# Patient Record
Sex: MALE | Race: WHITE | NOT HISPANIC OR LATINO | Employment: FULL TIME | URBAN - METROPOLITAN AREA
[De-identification: names, ages, dates, MRNs, and addresses within clinical notes are randomized per-mention and may not be internally consistent; named-entity substitution may affect disease eponyms.]

---

## 2019-05-10 PROCEDURE — 87491 CHLMYD TRACH DNA AMP PROBE: CPT | Performed by: FAMILY MEDICINE

## 2019-05-10 PROCEDURE — 87591 N.GONORRHOEAE DNA AMP PROB: CPT | Performed by: FAMILY MEDICINE

## 2019-05-10 PROCEDURE — 87661 TRICHOMONAS VAGINALIS AMPLIF: CPT | Performed by: FAMILY MEDICINE

## 2019-05-13 ENCOUNTER — TELEPHONE (OUTPATIENT)
Dept: URGENT CARE | Facility: CLINIC | Age: 34
End: 2019-05-13

## 2019-05-13 NOTE — TELEPHONE ENCOUNTER
----- Message from Bryson Buchanan MD sent at 5/13/2019  7:16 PM EDT -----  Please inform patient of negative lab result

## 2019-05-15 ENCOUNTER — TELEPHONE (OUTPATIENT)
Dept: URGENT CARE | Facility: CLINIC | Age: 34
End: 2019-05-15

## 2022-12-01 ENCOUNTER — APPOINTMENT (OUTPATIENT)
Dept: GENERAL RADIOLOGY | Facility: HOSPITAL | Age: 37
End: 2022-12-01

## 2022-12-01 ENCOUNTER — APPOINTMENT (OUTPATIENT)
Dept: CT IMAGING | Facility: HOSPITAL | Age: 37
End: 2022-12-01

## 2022-12-01 ENCOUNTER — HOSPITAL ENCOUNTER (EMERGENCY)
Facility: HOSPITAL | Age: 37
Discharge: HOME | End: 2022-12-02
Attending: STUDENT IN AN ORGANIZED HEALTH CARE EDUCATION/TRAINING PROGRAM | Admitting: EMERGENCY MEDICINE

## 2022-12-01 DIAGNOSIS — R10.10 ACUTE UPPER ABDOMINAL PAIN: Primary | ICD-10-CM

## 2022-12-01 LAB
ALBUMIN SERPL-MCNC: 4.1 G/DL (ref 3.5–5.2)
ALBUMIN/GLOB SERPL: 1.5 G/DL
ALP SERPL-CCNC: 69 U/L (ref 39–117)
ALT SERPL W P-5'-P-CCNC: 15 U/L (ref 1–41)
ANION GAP SERPL CALCULATED.3IONS-SCNC: 8 MMOL/L (ref 5–15)
AST SERPL-CCNC: 15 U/L (ref 1–40)
BASOPHILS # BLD AUTO: 0.09 10*3/MM3 (ref 0–0.2)
BASOPHILS NFR BLD AUTO: 0.5 % (ref 0–1.5)
BILIRUB SERPL-MCNC: 0.3 MG/DL (ref 0–1.2)
BUN SERPL-MCNC: 16 MG/DL (ref 6–20)
BUN/CREAT SERPL: 25 (ref 7–25)
CALCIUM SPEC-SCNC: 8.9 MG/DL (ref 8.6–10.5)
CHLORIDE SERPL-SCNC: 98 MMOL/L (ref 98–107)
CO2 SERPL-SCNC: 30 MMOL/L (ref 22–29)
CREAT SERPL-MCNC: 0.64 MG/DL (ref 0.76–1.27)
DEPRECATED RDW RBC AUTO: 45.1 FL (ref 37–54)
EGFRCR SERPLBLD CKD-EPI 2021: 125 ML/MIN/1.73
EOSINOPHIL # BLD AUTO: 1.4 10*3/MM3 (ref 0–0.4)
EOSINOPHIL NFR BLD AUTO: 7.4 % (ref 0.3–6.2)
ERYTHROCYTE [DISTWIDTH] IN BLOOD BY AUTOMATED COUNT: 12.6 % (ref 12.3–15.4)
GLOBULIN UR ELPH-MCNC: 2.7 GM/DL
GLUCOSE SERPL-MCNC: 108 MG/DL (ref 65–99)
HCT VFR BLD AUTO: 43.4 % (ref 37.5–51)
HGB BLD-MCNC: 14.5 G/DL (ref 13–17.7)
IMM GRANULOCYTES # BLD AUTO: 0.07 10*3/MM3 (ref 0–0.05)
IMM GRANULOCYTES NFR BLD AUTO: 0.4 % (ref 0–0.5)
LIPASE SERPL-CCNC: 30 U/L (ref 13–60)
LYMPHOCYTES # BLD AUTO: 1.2 10*3/MM3 (ref 0.7–3.1)
LYMPHOCYTES NFR BLD AUTO: 6.4 % (ref 19.6–45.3)
MCH RBC QN AUTO: 32.2 PG (ref 26.6–33)
MCHC RBC AUTO-ENTMCNC: 33.4 G/DL (ref 31.5–35.7)
MCV RBC AUTO: 96.4 FL (ref 79–97)
MONOCYTES # BLD AUTO: 1.13 10*3/MM3 (ref 0.1–0.9)
MONOCYTES NFR BLD AUTO: 6 % (ref 5–12)
NEUTROPHILS NFR BLD AUTO: 14.92 10*3/MM3 (ref 1.7–7)
NEUTROPHILS NFR BLD AUTO: 79.3 % (ref 42.7–76)
NRBC BLD AUTO-RTO: 0 /100 WBC (ref 0–0.2)
NT-PROBNP SERPL-MCNC: 852 PG/ML (ref 0–450)
PLATELET # BLD AUTO: 202 10*3/MM3 (ref 140–450)
PMV BLD AUTO: 9.7 FL (ref 6–12)
POTASSIUM SERPL-SCNC: 4.2 MMOL/L (ref 3.5–5.2)
PROT SERPL-MCNC: 6.8 G/DL (ref 6–8.5)
RBC # BLD AUTO: 4.5 10*6/MM3 (ref 4.14–5.8)
SODIUM SERPL-SCNC: 136 MMOL/L (ref 136–145)
TROPONIN T SERPL-MCNC: <0.01 NG/ML (ref 0–0.03)
WBC NRBC COR # BLD: 18.81 10*3/MM3 (ref 3.4–10.8)

## 2022-12-01 PROCEDURE — 70450 CT HEAD/BRAIN W/O DYE: CPT

## 2022-12-01 PROCEDURE — 93005 ELECTROCARDIOGRAM TRACING: CPT

## 2022-12-01 PROCEDURE — 0 IOPAMIDOL PER 1 ML: Performed by: EMERGENCY MEDICINE

## 2022-12-01 PROCEDURE — 83690 ASSAY OF LIPASE: CPT | Performed by: STUDENT IN AN ORGANIZED HEALTH CARE EDUCATION/TRAINING PROGRAM

## 2022-12-01 PROCEDURE — 80053 COMPREHEN METABOLIC PANEL: CPT | Performed by: STUDENT IN AN ORGANIZED HEALTH CARE EDUCATION/TRAINING PROGRAM

## 2022-12-01 PROCEDURE — 83880 ASSAY OF NATRIURETIC PEPTIDE: CPT | Performed by: STUDENT IN AN ORGANIZED HEALTH CARE EDUCATION/TRAINING PROGRAM

## 2022-12-01 PROCEDURE — 74177 CT ABD & PELVIS W/CONTRAST: CPT

## 2022-12-01 PROCEDURE — 71045 X-RAY EXAM CHEST 1 VIEW: CPT

## 2022-12-01 PROCEDURE — 85025 COMPLETE CBC W/AUTO DIFF WBC: CPT | Performed by: STUDENT IN AN ORGANIZED HEALTH CARE EDUCATION/TRAINING PROGRAM

## 2022-12-01 PROCEDURE — 71275 CT ANGIOGRAPHY CHEST: CPT

## 2022-12-01 PROCEDURE — 99284 EMERGENCY DEPT VISIT MOD MDM: CPT

## 2022-12-01 PROCEDURE — 84484 ASSAY OF TROPONIN QUANT: CPT | Performed by: STUDENT IN AN ORGANIZED HEALTH CARE EDUCATION/TRAINING PROGRAM

## 2022-12-01 PROCEDURE — 93005 ELECTROCARDIOGRAM TRACING: CPT | Performed by: STUDENT IN AN ORGANIZED HEALTH CARE EDUCATION/TRAINING PROGRAM

## 2022-12-01 RX ORDER — SODIUM CHLORIDE 0.9 % (FLUSH) 0.9 %
10 SYRINGE (ML) INJECTION AS NEEDED
Status: DISCONTINUED | OUTPATIENT
Start: 2022-12-01 | End: 2022-12-02 | Stop reason: HOSPADM

## 2022-12-01 RX ORDER — ASPIRIN 81 MG/1
324 TABLET, CHEWABLE ORAL ONCE
Status: COMPLETED | OUTPATIENT
Start: 2022-12-01 | End: 2022-12-02

## 2022-12-01 RX ADMIN — IOPAMIDOL 100 ML: 755 INJECTION, SOLUTION INTRAVENOUS at 23:23

## 2022-12-02 VITALS
DIASTOLIC BLOOD PRESSURE: 96 MMHG | SYSTOLIC BLOOD PRESSURE: 159 MMHG | OXYGEN SATURATION: 99 % | RESPIRATION RATE: 16 BRPM | TEMPERATURE: 98.7 F | WEIGHT: 230 LBS | HEIGHT: 70 IN | HEART RATE: 116 BPM | BODY MASS INDEX: 32.93 KG/M2

## 2022-12-02 LAB
HOLD SPECIMEN: NORMAL
QT INTERVAL: 342 MS
QT INTERVAL: 350 MS
QTC INTERVAL: 452 MS
QTC INTERVAL: 456 MS
TROPONIN T SERPL-MCNC: <0.01 NG/ML (ref 0–0.03)
WHOLE BLOOD HOLD COAG: NORMAL
WHOLE BLOOD HOLD SPECIMEN: NORMAL

## 2022-12-02 PROCEDURE — 93005 ELECTROCARDIOGRAM TRACING: CPT | Performed by: STUDENT IN AN ORGANIZED HEALTH CARE EDUCATION/TRAINING PROGRAM

## 2022-12-02 PROCEDURE — 36415 COLL VENOUS BLD VENIPUNCTURE: CPT

## 2022-12-02 PROCEDURE — 96374 THER/PROPH/DIAG INJ IV PUSH: CPT

## 2022-12-02 PROCEDURE — 84484 ASSAY OF TROPONIN QUANT: CPT | Performed by: STUDENT IN AN ORGANIZED HEALTH CARE EDUCATION/TRAINING PROGRAM

## 2022-12-02 RX ORDER — ALUMINA, MAGNESIA, AND SIMETHICONE 2400; 2400; 240 MG/30ML; MG/30ML; MG/30ML
15 SUSPENSION ORAL ONCE
Status: COMPLETED | OUTPATIENT
Start: 2022-12-02 | End: 2022-12-02

## 2022-12-02 RX ORDER — LIDOCAINE HYDROCHLORIDE 20 MG/ML
15 SOLUTION OROPHARYNGEAL ONCE
Status: COMPLETED | OUTPATIENT
Start: 2022-12-02 | End: 2022-12-02

## 2022-12-02 RX ORDER — FAMOTIDINE 10 MG/ML
20 INJECTION, SOLUTION INTRAVENOUS ONCE
Status: COMPLETED | OUTPATIENT
Start: 2022-12-02 | End: 2022-12-02

## 2022-12-02 RX ADMIN — FAMOTIDINE 20 MG: 10 INJECTION INTRAVENOUS at 01:20

## 2022-12-02 RX ADMIN — ALUMINUM HYDROXIDE, MAGNESIUM HYDROXIDE, AND DIMETHICONE 15 ML: 400; 400; 40 SUSPENSION ORAL at 01:20

## 2022-12-02 RX ADMIN — ASPIRIN 81 MG CHEWABLE TABLET 324 MG: 81 TABLET CHEWABLE at 00:30

## 2022-12-02 RX ADMIN — LIDOCAINE HYDROCHLORIDE 15 ML: 20 SOLUTION ORAL at 01:20

## 2022-12-02 NOTE — ED PROVIDER NOTES
[facility]    EMERGENCY DEPARTMENT ENCOUNTER      Pt Name: David English  MRN: [id number]  YOB: 1985  Date of evaluation: 12/1/2022  Provider: Kane Dia MD    CHIEF COMPLAINT       Chief Complaint   Patient presents with   • Chest Pain   • Shortness of Breath         HISTORY OF PRESENT ILLNESS  (Location/Symptom, Timing/Onset, Context/Setting, Quality, Duration, Modifying Factors, Severity.)   David English is a 37 y.o. male who presents to the emergency department moderate severity middle upper abdominal pain chest pain described as a burning sensation patient was initially seen by ambulance but then decided not to go to the hospital.  He also states he has some mild shortness of breath vomiting, diarrhea or any other associated symptoms.  No obvious inciting or modifying factor.  He also endorses some some tingling in his left arm.      Nursing notes were reviewed.    REVIEW OF SYSTEMS    (2-9 systems for level 4, 10 or more for level 5)   ROS:  General:  No fevers, no chills, no weakness  Cardiovascular: Chest pain  Respiratory: Shortness of breath  Gastrointestinal: Abdominal pain  Musculoskeletal:  No muscle pain, no joint pain  Skin:  No rash  Neurologic:  No speech problems, no headache, no extremity numbness, no extremity tingling, no extremity weakness  Psychiatric:  No anxiety  Genitourinary:  No dysuria, no hematuria    Except as noted above the remainder of the review of systems was reviewed and negative.       PAST MEDICAL HISTORY   None    SURGICAL HISTORY     None    CURRENT MEDICATIONS     None    ALLERGIES     Patient has no known allergies.    FAMILY HISTORY     No family history on file.       SOCIAL HISTORY       Social History     Socioeconomic History   • Marital status:          PHYSICAL EXAM    (up to 7 for level 4, 8 or more for level 5)     Vitals:    12/01/22 2332 12/02/22 0000 12/02/22 0030 12/02/22 0126   BP: 162/90 158/89 159/96    BP Location:       Patient  Position:       Pulse:  108 116    Resp:    16   Temp:       TempSrc:       SpO2:       Weight:       Height:           Physical Exam  General: Awake, alert, no acute distress.  HEENT: Conjunctivae normal.  Neck: Trachea midline.  Cardiac: Heart regular rate, rhythm, no murmurs, rubs, or gallops  Lungs: Lungs are clear to auscultation, there is no wheezing, rhonchi, or rales. There is no use of accessory muscles.  Chest wall: There is no tenderness to palpation over the chest wall or over ribs  Abdomen: Mild epigastric tenderness without any distention, rebound, guarding  Musculoskeletal: No deformity.  Neuro: Alert and oriented x 4.  Dermatology: Skin is warm and dry  Psych: Mentation is grossly normal, cognition is grossly normal. Affect is appropriate.        DIAGNOSTIC RESULTS     EKG: All EKGs are interpreted by the Emergency Department Physician who either signs or Co-signs this chart in the absence of a cardiologist.    ECG 12 Lead ED Triage Standing Order; Chest Pain   Final Result   Test Reason : ED Triage Standing Order~   Blood Pressure :   */*   mmHG   Vent. Rate : 114 BPM     Atrial Rate : 114 BPM      P-R Int : 152 ms          QRS Dur :  92 ms       QT Int : 336 ms       P-R-T Axes :  62  14  36 degrees      QTc Int : 463 ms      Sinus tachycardia   Otherwise normal ECG   When compared with ECG of 01-DEC-2022 23:03,   No significant change was found   Confirmed by CAM WASSERMAN ([id number]) on 12/4/2022 8:15:22 AM      Referred By: ED MD           Confirmed By: CAM WASSERMAN      ECG 12 Lead Chest Pain   Final Result   Test Reason : Chest Pain   Blood Pressure :   */*   mmHG   Vent. Rate : 105 BPM     Atrial Rate : 105 BPM      P-R Int : 170 ms          QRS Dur :  88 ms       QT Int : 342 ms       P-R-T Axes :  50  -7  34 degrees      QTc Int : 452 ms      Sinus tachycardia   Right atrial enlargement   Borderline ECG   When compared with ECG of 01-DEC-2022 23:01, (Unconfirmed)   No significant  change was found   Confirmed by MK LI ([id number]) on 12/2/2022 12:03:42 AM      Referred By: GUILLERMO           Confirmed By: MK LI      ECG 12 Lead ED Triage Standing Order; Chest Pain   Final Result   Test Reason : ED Triage Standing Order~   Blood Pressure :   */*   mmHG   Vent. Rate : 102 BPM     Atrial Rate : 102 BPM      P-R Int : 172 ms          QRS Dur :  90 ms       QT Int : 350 ms       P-R-T Axes :  47 -10  27 degrees      QTc Int : 456 ms      Sinus tachycardia   Nonspecific ST abnormality   Abnormal ECG   No previous ECGs available   Confirmed by MK LI ([id number]) on 12/2/2022 12:03:50 AM      Referred By:            Confirmed By: MK LI          RADIOLOGY:   Non-plain film images such as CT, Ultrasound and MRI are read by the radiologist. Plain radiographic images are visualized and preliminarily interpreted by the emergency physician with the below findings:      [x] Radiologist's Report Reviewed:  XR Chest 1 View   Final Result      No acute cardiopulmonary process.      Electronically signed by:  Akbar Alvarado D.O.     12/1/2022 10:51 PM [place]      CT Angiogram Chest   Final Result      No acute process within the chest, abdomen or pelvis.         Electronically signed by:  Akbar Alvarado D.O.     12/1/2022 10:19 PM [place]      CT Abdomen Pelvis With Contrast   Final Result      No acute process within the chest, abdomen or pelvis.         Electronically signed by:  Akbar Alvarado D.O.     12/1/2022 10:19 PM [place]      CT Head Without Contrast Stroke Protocol   Final Result      No acute intracranial abnormality. MRI is more sensitive for acute infarction first 24 to 48 hours.            Results were discussed with Dr. WASSERMAN via phone at 12/1/2022 9:25 PM by Dr. Lee.  A read back of two patient identifiers and impression occurred.       Electronically signed by:  Enoc Lee M.D.     12/1/2022 9:27 PM Mountain [place]            ED BEDSIDE  ULTRASOUND:   Performed by ED Physician - none    LABS:    I have reviewed and interpreted all of the currently available lab results from this visit (if applicable):  Results for orders placed or performed during the hospital encounter of 12/01/22   Troponin    Specimen: Blood   Result Value Ref Range    Troponin T <0.010 0.000 - 0.030 ng/mL   Troponin    Specimen: Blood   Result Value Ref Range    Troponin T <0.010 0.000 - 0.030 ng/mL   Comprehensive Metabolic Panel    Specimen: Blood   Result Value Ref Range    Glucose 108 (H) 65 - 99 mg/dL    BUN 16 6 - 20 mg/dL    Creatinine 0.64 (L) 0.76 - 1.27 mg/dL    Sodium 136 136 - 145 mmol/L    Potassium 4.2 3.5 - 5.2 mmol/L    Chloride 98 98 - 107 mmol/L    CO2 30.0 (H) 22.0 - 29.0 mmol/L    Calcium 8.9 8.6 - 10.5 mg/dL    Total Protein 6.8 6.0 - 8.5 g/dL    Albumin 4.10 3.50 - 5.20 g/dL    ALT (SGPT) 15 1 - 41 U/L    AST (SGOT) 15 1 - 40 U/L    Alkaline Phosphatase 69 39 - 117 U/L    Total Bilirubin 0.3 0.0 - 1.2 mg/dL    Globulin 2.7 gm/dL    A/G Ratio 1.5 g/dL    BUN/Creatinine Ratio 25.0 7.0 - 25.0    Anion Gap 8.0 5.0 - 15.0 mmol/L    eGFR 125.0 >60.0 mL/min/1.73   Lipase    Specimen: Blood   Result Value Ref Range    Lipase 30 13 - 60 U/L   BNP    Specimen: Blood   Result Value Ref Range    proBNP 852.0 (H) 0.0 - 450.0 pg/mL   CBC Auto Differential    Specimen: Blood   Result Value Ref Range    WBC 18.81 (H) 3.40 - 10.80 10*3/mm3    RBC 4.50 4.14 - 5.80 10*6/mm3    Hemoglobin 14.5 13.0 - 17.7 g/dL    Hematocrit 43.4 37.5 - 51.0 %    MCV 96.4 79.0 - 97.0 fL    MCH 32.2 26.6 - 33.0 pg    MCHC 33.4 31.5 - 35.7 g/dL    RDW 12.6 12.3 - 15.4 %    RDW-SD 45.1 37.0 - 54.0 fl    MPV 9.7 6.0 - 12.0 fL    Platelets 202 140 - 450 10*3/mm3    Neutrophil % 79.3 (H) 42.7 - 76.0 %    Lymphocyte % 6.4 (L) 19.6 - 45.3 %    Monocyte % 6.0 5.0 - 12.0 %    Eosinophil % 7.4 (H) 0.3 - 6.2 %    Basophil % 0.5 0.0 - 1.5 %    Immature Grans % 0.4 0.0 - 0.5 %    Neutrophils, Absolute 14.92  (H) 1.70 - 7.00 10*3/mm3    Lymphocytes, Absolute 1.20 0.70 - 3.10 10*3/mm3    Monocytes, Absolute 1.13 (H) 0.10 - 0.90 10*3/mm3    Eosinophils, Absolute 1.40 (H) 0.00 - 0.40 10*3/mm3    Basophils, Absolute 0.09 0.00 - 0.20 10*3/mm3    Immature Grans, Absolute 0.07 (H) 0.00 - 0.05 10*3/mm3    nRBC 0.0 0.0 - 0.2 /100 WBC   ECG 12 Lead ED Triage Standing Order; Chest Pain   Result Value Ref Range    QT Interval 350 ms    QTC Interval 456 ms   ECG 12 Lead Chest Pain   Result Value Ref Range    QT Interval 342 ms    QTC Interval 452 ms   ECG 12 Lead ED Triage Standing Order; Chest Pain   Result Value Ref Range    QT Interval 336 ms    QTC Interval 463 ms   Green Top (Gel)   Result Value Ref Range    Extra Tube Hold for add-ons.    Lavender Top   Result Value Ref Range    Extra Tube hold for add-on    Gold Top - SST   Result Value Ref Range    Extra Tube Hold for add-ons.    Gray Top   Result Value Ref Range    Extra Tube Hold for add-ons.    Light Blue Top   Result Value Ref Range    Extra Tube Hold for add-ons.         All other labs were within normal range or not returned as of this dictation.      EMERGENCY DEPARTMENT COURSE and DIFFERENTIAL DIAGNOSIS/MDM:   Vitals:    Vitals:    12/01/22 2332 12/02/22 0000 12/02/22 0030 12/02/22 0126   BP: 162/90 158/89 159/96    BP Location:       Patient Position:       Pulse:  108 116    Resp:    16   Temp:       TempSrc:       SpO2:       Weight:       Height:           ED Course as of 12/07/22 0341   Fri Dec 02, 2022   0135 Repeat 2-hour troponin shows sinus tachycardia.  No evidence of ST-T wave changes consistent with ischemia.  Repeat 2-hour troponin was less than 0.010.  Patient feels better after GI cocktail.  Ready for discharge to home. [FC]      ED Course User Index  [FC] Poppy Patrick, AUDRA       No evidence of emergent pathology identified on this patient's extensive work-up.  CT imaging demonstrates no evidence of dissection, PE, bili pathology, obstruction,  perforation, appendicitis.  Not consistent with ACS and ECG and troponin testing demonstrate no evidence of myocardial ischemia or injury in the setting of low risk.  Patient is appropriate for discharge home at this time with close outpatient follow-up    I had a discussion with the patient/family regarding diagnosis, diagnostic results, treatment plan, and medications.  The patient/family indicated understanding of these instructions.  I spent adequate time at the bedside preceding discharge necessary to personally discuss the aftercare instructions, giving patient education, providing explanations of the results of our evaluations/findings, and my decision making to assure that the patient/family understand the plan of care.  Time was allotted to answer questions at that time and throughout the ED course.  Emphasis was placed on timely follow-up after discharge.  I also discussed the potential for the development of an acute emergent condition requiring further evaluation, admission, or even surgical intervention. I discussed that we found nothing during the visit today indicating the need for further workup, admission, or the presence of an unstable medical condition.  I encouraged the patient to return to the emergency department immediately for ANY concerns, worsening, new complaints, or if symptoms persist and unable to seek follow-up in a timely fashion.  The patient/family expressed understanding and agreement with this plan.  The patient will follow-up with their PCP in 1-2 days for reevaluation.       MEDICATIONS ADMINISTERED IN ED:  Medications   aspirin chewable tablet 324 mg (324 mg Oral Given 12/2/22 0030)   iopamidol (ISOVUE-370) 76 % injection 100 mL (100 mL Intravenous Given 12/1/22 2323)   aluminum-magnesium hydroxide-simethicone (MAALOX MAX) 400-400-40 MG/5ML suspension 15 mL (15 mL Oral Given 12/2/22 0120)   Lidocaine Viscous HCl (XYLOCAINE) 2 % solution 15 mL (15 mL Mouth/Throat Given 12/2/22  0120)   famotidine (PEPCID) injection 20 mg (20 mg Intravenous Given 12/2/22 0120)       PROCEDURES:  Procedures    CRITICAL CARE TIME    Total Critical Care time was 0 minutes, excluding separately reportable procedures.   There was a high probability of clinically significant/life threatening deterioration in the patient's condition which required my urgent intervention.      FINAL IMPRESSION      1. Acute upper abdominal pain          DISPOSITION/PLAN     ED Disposition     ED Disposition   Discharge    Condition   Stable    Comment   --             PATIENT REFERRED TO:  Provider, No Known  [place] [id number]    Schedule an appointment as soon as possible for a visit in 2 days      [facility] Emergency Department  [address] [place] [id number] [phone]    If symptoms worsen      DISCHARGE MEDICATIONS:     Medication List      CONTINUE taking these medications    buprenorphine-naloxone 8-2 MG film film  Commonly known as: SUBOXONE                Comment: Please note this report has been produced using speech recognition software.      Kane Dia MD  Attending Emergency Physician               Kane Dia MD  12/07/22 [id number]

## 2022-12-04 LAB
QT INTERVAL: 336 MS
QTC INTERVAL: 463 MS